# Patient Record
Sex: FEMALE | Race: WHITE | Employment: FULL TIME | URBAN - NONMETROPOLITAN AREA
[De-identification: names, ages, dates, MRNs, and addresses within clinical notes are randomized per-mention and may not be internally consistent; named-entity substitution may affect disease eponyms.]

---

## 2020-08-21 ENCOUNTER — HOSPITAL ENCOUNTER (EMERGENCY)
Age: 40
Discharge: HOME | End: 2020-08-21
Payer: COMMERCIAL

## 2020-08-21 ENCOUNTER — APPOINTMENT (OUTPATIENT)
Dept: ULTRASOUND IMAGING | Age: 40
End: 2020-08-21
Payer: COMMERCIAL

## 2020-08-21 ENCOUNTER — APPOINTMENT (OUTPATIENT)
Dept: CT IMAGING | Age: 40
End: 2020-08-21
Payer: COMMERCIAL

## 2020-08-21 VITALS
BODY MASS INDEX: 35.36 KG/M2 | TEMPERATURE: 98.7 F | RESPIRATION RATE: 16 BRPM | WEIGHT: 220 LBS | HEIGHT: 66 IN | DIASTOLIC BLOOD PRESSURE: 66 MMHG | HEART RATE: 68 BPM | SYSTOLIC BLOOD PRESSURE: 122 MMHG | OXYGEN SATURATION: 98 %

## 2020-08-21 LAB
ALBUMIN SERPL-MCNC: 4.2 G/DL (ref 3.5–5.1)
ALP BLD-CCNC: 55 U/L (ref 38–126)
ALT SERPL-CCNC: 38 U/L (ref 11–66)
AMPHETAMINE+METHAMPHETAMINE URINE SCREEN: NEGATIVE
ANION GAP SERPL CALCULATED.3IONS-SCNC: 9 MEQ/L (ref 8–16)
AST SERPL-CCNC: 34 U/L (ref 5–40)
BACTERIA: ABNORMAL /HPF
BARBITURATE QUANTITATIVE URINE: NEGATIVE
BASOPHILS # BLD: 0.8 %
BASOPHILS ABSOLUTE: 0.1 THOU/MM3 (ref 0–0.1)
BENZODIAZEPINE QUANTITATIVE URINE: NEGATIVE
BILIRUB SERPL-MCNC: < 0.2 MG/DL (ref 0.3–1.2)
BILIRUBIN DIRECT: < 0.2 MG/DL (ref 0–0.3)
BILIRUBIN URINE: NEGATIVE
BLOOD, URINE: ABNORMAL
BUN BLDV-MCNC: 14 MG/DL (ref 7–22)
CALCIUM SERPL-MCNC: 9.2 MG/DL (ref 8.5–10.5)
CANNABINOID QUANTITATIVE URINE: NEGATIVE
CASTS 2: ABNORMAL /LPF
CASTS UA: ABNORMAL /LPF
CHARACTER, URINE: CLEAR
CHLORIDE BLD-SCNC: 104 MEQ/L (ref 98–111)
CO2: 24 MEQ/L (ref 23–33)
COCAINE METABOLITE QUANTITATIVE URINE: NEGATIVE
COLOR: YELLOW
CREAT SERPL-MCNC: 0.9 MG/DL (ref 0.4–1.2)
CRYSTALS, UA: ABNORMAL
EOSINOPHIL # BLD: 1.8 %
EOSINOPHILS ABSOLUTE: 0.2 THOU/MM3 (ref 0–0.4)
EPITHELIAL CELLS, UA: ABNORMAL /HPF
ERYTHROCYTE [DISTWIDTH] IN BLOOD BY AUTOMATED COUNT: 14.7 % (ref 11.5–14.5)
ERYTHROCYTE [DISTWIDTH] IN BLOOD BY AUTOMATED COUNT: 44.6 FL (ref 35–45)
GFR SERPL CREATININE-BSD FRML MDRD: 69 ML/MIN/1.73M2
GLUCOSE BLD-MCNC: 136 MG/DL (ref 70–108)
GLUCOSE URINE: NEGATIVE MG/DL
HCT VFR BLD CALC: 36.2 % (ref 37–47)
HEMOGLOBIN: 11.8 GM/DL (ref 12–16)
IMMATURE GRANS (ABS): 0.08 THOU/MM3 (ref 0–0.07)
IMMATURE GRANULOCYTES: 0.7 %
KETONES, URINE: NEGATIVE
LEUKOCYTE ESTERASE, URINE: ABNORMAL
LIPASE: 23.5 U/L (ref 5.6–51.3)
LYMPHOCYTES # BLD: 18.5 %
LYMPHOCYTES ABSOLUTE: 2.1 THOU/MM3 (ref 1–4.8)
MCH RBC QN AUTO: 27.9 PG (ref 26–33)
MCHC RBC AUTO-ENTMCNC: 32.6 GM/DL (ref 32.2–35.5)
MCV RBC AUTO: 85.6 FL (ref 81–99)
MISCELLANEOUS 2: ABNORMAL
MONOCYTES # BLD: 6.8 %
MONOCYTES ABSOLUTE: 0.8 THOU/MM3 (ref 0.4–1.3)
NITRITE, URINE: NEGATIVE
NUCLEATED RED BLOOD CELLS: 0 /100 WBC
OPIATES, URINE: NEGATIVE
OSMOLALITY CALCULATION: 276.4 MOSMOL/KG (ref 275–300)
OXYCODONE: NEGATIVE
PH UA: 5.5 (ref 5–9)
PHENCYCLIDINE QUANTITATIVE URINE: NEGATIVE
PLATELET # BLD: 248 THOU/MM3 (ref 130–400)
PMV BLD AUTO: 11.3 FL (ref 9.4–12.4)
POTASSIUM SERPL-SCNC: 4.3 MEQ/L (ref 3.5–5.2)
PREGNANCY, SERUM: NEGATIVE
PROTEIN UA: NEGATIVE
RBC # BLD: 4.23 MILL/MM3 (ref 4.2–5.4)
RBC URINE: ABNORMAL /HPF
REASON FOR REJECTION: NORMAL
REJECTED TEST: NORMAL
RENAL EPITHELIAL, UA: ABNORMAL
SEG NEUTROPHILS: 71.4 %
SEGMENTED NEUTROPHILS ABSOLUTE COUNT: 7.9 THOU/MM3 (ref 1.8–7.7)
SODIUM BLD-SCNC: 137 MEQ/L (ref 135–145)
SPECIFIC GRAVITY, URINE: 1.01 (ref 1–1.03)
TOTAL PROTEIN: 7.3 G/DL (ref 6.1–8)
UROBILINOGEN, URINE: 0.2 EU/DL (ref 0–1)
WBC # BLD: 11.1 THOU/MM3 (ref 4.8–10.8)
WBC UA: ABNORMAL /HPF
YEAST: ABNORMAL

## 2020-08-21 PROCEDURE — 99284 EMERGENCY DEPT VISIT MOD MDM: CPT

## 2020-08-21 PROCEDURE — 74177 CT ABD & PELVIS W/CONTRAST: CPT

## 2020-08-21 PROCEDURE — 80053 COMPREHEN METABOLIC PANEL: CPT

## 2020-08-21 PROCEDURE — 96374 THER/PROPH/DIAG INJ IV PUSH: CPT

## 2020-08-21 PROCEDURE — 76830 TRANSVAGINAL US NON-OB: CPT

## 2020-08-21 PROCEDURE — 76856 US EXAM PELVIC COMPLETE: CPT

## 2020-08-21 PROCEDURE — 83690 ASSAY OF LIPASE: CPT

## 2020-08-21 PROCEDURE — 36415 COLL VENOUS BLD VENIPUNCTURE: CPT

## 2020-08-21 PROCEDURE — 2580000003 HC RX 258: Performed by: PHYSICIAN ASSISTANT

## 2020-08-21 PROCEDURE — 96375 TX/PRO/DX INJ NEW DRUG ADDON: CPT

## 2020-08-21 PROCEDURE — 6360000002 HC RX W HCPCS

## 2020-08-21 PROCEDURE — 84703 CHORIONIC GONADOTROPIN ASSAY: CPT

## 2020-08-21 PROCEDURE — 80307 DRUG TEST PRSMV CHEM ANLYZR: CPT

## 2020-08-21 PROCEDURE — 93975 VASCULAR STUDY: CPT

## 2020-08-21 PROCEDURE — 6360000004 HC RX CONTRAST MEDICATION: Performed by: PHYSICIAN ASSISTANT

## 2020-08-21 PROCEDURE — 82248 BILIRUBIN DIRECT: CPT

## 2020-08-21 PROCEDURE — 85025 COMPLETE CBC W/AUTO DIFF WBC: CPT

## 2020-08-21 PROCEDURE — 99283 EMERGENCY DEPT VISIT LOW MDM: CPT

## 2020-08-21 PROCEDURE — 81001 URINALYSIS AUTO W/SCOPE: CPT

## 2020-08-21 RX ORDER — ONDANSETRON 4 MG/1
4 TABLET, ORALLY DISINTEGRATING ORAL EVERY 8 HOURS PRN
Qty: 12 TABLET | Refills: 0 | Status: SHIPPED | OUTPATIENT
Start: 2020-08-21

## 2020-08-21 RX ORDER — ONDANSETRON 2 MG/ML
INJECTION INTRAMUSCULAR; INTRAVENOUS
Status: COMPLETED
Start: 2020-08-21 | End: 2020-08-21

## 2020-08-21 RX ORDER — 0.9 % SODIUM CHLORIDE 0.9 %
1000 INTRAVENOUS SOLUTION INTRAVENOUS ONCE
Status: COMPLETED | OUTPATIENT
Start: 2020-08-21 | End: 2020-08-21

## 2020-08-21 RX ORDER — CYCLOBENZAPRINE HCL 10 MG
10 TABLET ORAL 3 TIMES DAILY PRN
Qty: 21 TABLET | Refills: 0 | Status: SHIPPED | OUTPATIENT
Start: 2020-08-21 | End: 2020-08-21 | Stop reason: CLARIF

## 2020-08-21 RX ORDER — KETOROLAC TROMETHAMINE 10 MG/1
10 TABLET, FILM COATED ORAL EVERY 8 HOURS PRN
Qty: 15 TABLET | Refills: 0 | Status: SHIPPED | OUTPATIENT
Start: 2020-08-21

## 2020-08-21 RX ORDER — ONDANSETRON 2 MG/ML
4 INJECTION INTRAMUSCULAR; INTRAVENOUS ONCE
Status: COMPLETED | OUTPATIENT
Start: 2020-08-21 | End: 2020-08-21

## 2020-08-21 RX ORDER — KETOROLAC TROMETHAMINE 30 MG/ML
30 INJECTION, SOLUTION INTRAMUSCULAR; INTRAVENOUS ONCE
Status: COMPLETED | OUTPATIENT
Start: 2020-08-21 | End: 2020-08-21

## 2020-08-21 RX ORDER — KETOROLAC TROMETHAMINE 30 MG/ML
INJECTION, SOLUTION INTRAMUSCULAR; INTRAVENOUS
Status: COMPLETED
Start: 2020-08-21 | End: 2020-08-21

## 2020-08-21 RX ORDER — KETOROLAC TROMETHAMINE 10 MG/1
10 TABLET, FILM COATED ORAL EVERY 8 HOURS PRN
Qty: 15 TABLET | Refills: 0 | Status: SHIPPED | OUTPATIENT
Start: 2020-08-21 | End: 2020-08-21 | Stop reason: CLARIF

## 2020-08-21 RX ADMIN — ONDANSETRON 4 MG: 2 INJECTION INTRAMUSCULAR; INTRAVENOUS at 16:24

## 2020-08-21 RX ADMIN — KETOROLAC TROMETHAMINE 30 MG: 30 INJECTION, SOLUTION INTRAMUSCULAR; INTRAVENOUS at 16:26

## 2020-08-21 RX ADMIN — SODIUM CHLORIDE 1000 ML: 9 INJECTION, SOLUTION INTRAVENOUS at 16:24

## 2020-08-21 RX ADMIN — IOPAMIDOL 80 ML: 755 INJECTION, SOLUTION INTRAVENOUS at 17:50

## 2020-08-21 RX ADMIN — KETOROLAC TROMETHAMINE 30 MG: 30 INJECTION, SOLUTION INTRAMUSCULAR at 16:26

## 2020-08-21 ASSESSMENT — PAIN SCALES - GENERAL
PAINLEVEL_OUTOF10: 10
PAINLEVEL_OUTOF10: 2
PAINLEVEL_OUTOF10: 2

## 2020-08-21 NOTE — ED NOTES
ED nurse-to-nurse bedside report    Chief Complaint   Patient presents with    Flank Pain      LOC: alert and orientated to name, place, date  Vital signs   Vitals:    08/21/20 1607 08/21/20 1618 08/21/20 1835   BP: (!) 165/87  (!) 141/67   Pulse: 100  75   Resp: 18  16   Temp: 98.7 °F (37.1 °C)     TempSrc: Oral     SpO2: 99%  98%   Weight:  220 lb (99.8 kg)    Height:  5' 6\" (1.676 m)       Pain:    Pain Interventions: toradol  Pain Goal: 0  Oxygen: No    Current needs required none   Telemetry: No  LDAs:   Peripheral IV 08/21/20 Right Forearm (Active)   Site Assessment Clean;Dry; Intact 08/21/20 1618   Dressing Status Clean;Dry; Intact 08/21/20 1618     Continuous Infusions:   Mobility: Independent  [place] Fall Risk Score: No flowsheet data found.   Fall Interventions: none  Report given to: April Zamudio RN  08/21/20 2373

## 2020-08-21 NOTE — ED NOTES
Patient resting in bed. Respirations easy and unlabored. No distress noted. Call light within reach. Updated on POC. Denies pain at this time.       Dulce Walter RN  08/21/20 9139

## 2020-08-21 NOTE — ED NOTES
Received handoff from [place], [facility]. Pt resting in bed, VSS. Respirs easy and unlabored. Pt denies needs or pain currently. Call light in place.       Deborah Nelson  08/21/20 1911

## 2020-08-22 ASSESSMENT — ENCOUNTER SYMPTOMS
BACK PAIN: 0
CONSTIPATION: 0
ABDOMINAL PAIN: 1
SHORTNESS OF BREATH: 0
COUGH: 0
BLOOD IN STOOL: 0
RHINORRHEA: 0
VOMITING: 1
NAUSEA: 1
COLOR CHANGE: 0
DIARRHEA: 0
SORE THROAT: 0

## 2020-08-22 NOTE — ED PROVIDER NOTES
[facility] EMERGENCY DEPT      CHIEF COMPLAINT       Chief Complaint   Patient presents with    Flank Pain       Nurses Notes reviewed and I agree except asnoted in the HPI. HISTORY OFPRESENT ILLNESS    Deborrmaximino Norton is a 38 y.o. female who presents to the emergency department for evaluation of right flank and right lower quadrant abdominal pain. The patient reports that at approximately 1000 this morning, she developed pain in her right lower flank that radiates into her right lower abdomen. The patient reports worsening of her pain at approximately 1400 the afternoon. The patient reports associated nausea and states that she vomited 1 time prior to arrival to this department. The patient reports having chills and a subjective fever but denies any documented fever. The patient denies any diarrhea, constipation, urinary symptoms, or blood in her urine or stool. The patient reports that she is currently menstruating but states that her menstrual period has been normal.  The patient otherwise denies any vaginal discharge. The patient denies chest pain, shortness of breath, or URI symptoms. The patient denies a history of kidney stones. The patient denies a history of abdominal problems or surgeries. There are no additional complaints at this time. REVIEW OF SYSTEMS      Review of Systems   Constitutional: Positive for chills and fever (subjective). Negative for fatigue. HENT: Negative for congestion, ear pain, rhinorrhea and sore throat. Respiratory: Negative for cough and shortness of breath. Cardiovascular: Negative for chest pain and palpitations. Gastrointestinal: Positive for abdominal pain (RLQ), nausea and vomiting. Negative for blood in stool, constipation and diarrhea. Genitourinary: Positive for flank pain (right) and vaginal bleeding (Currently menstruating).  Negative for decreased urine volume, difficulty urinating, dysuria, frequency, hematuria, pelvic pain, urgency, vaginal discharge and vaginal pain. Musculoskeletal: Negative for arthralgias, back pain, myalgias and neck pain. Skin: Negative for color change and pallor. Neurological: Negative for dizziness, weakness and light-headedness. Hematological: Negative for adenopathy. PAST MEDICAL HISTORY    has no past medical history on file. SURGICAL HISTORY      has no past surgical history on file. CURRENT MEDICATIONS       Discharge Medication List as of 8/21/2020  9:02 PM          ALLERGIES     has No Known Allergies. FAMILY HISTORY     has no family status information on file. @[employer]@    SOCIAL HISTORY      reports that she has never smoked. She has never used smokeless tobacco. She reports previous alcohol use. She reports that she does not use drugs. PHYSICAL EXAM     INITIAL VITALS:  height is 5' 6\" (1.676 m) and weight is 220 lb (99.8 kg). Her oral temperature is 98.7 °F (37.1 °C). Her blood pressure is 122/66 and her pulse is 68. Her respiration is 16 and oxygen saturation is 98%. Physical Exam  Vitals signs and nursing note reviewed. Constitutional:       General: She is not in acute distress. Appearance: Normal appearance. She is well-developed. She is not ill-appearing, toxic-appearing or diaphoretic. HENT:      Head: Normocephalic and atraumatic. Right Ear: External ear normal.      Left Ear: External ear normal.      Nose: Nose normal.      Mouth/Throat:      Mouth: Mucous membranes are moist.      Pharynx: Oropharynx is clear. Eyes:      General: No scleral icterus. Right eye: No discharge. Left eye: No discharge. Conjunctiva/sclera: Conjunctivae normal.      Pupils: Pupils are equal, round, and reactive to light. Neck:      Musculoskeletal: Normal range of motion. Cardiovascular:      Rate and Rhythm: Normal rate and regular rhythm. Heart sounds: Normal heart sounds. No murmur. No friction rub. No gallop.     Pulmonary:      Effort: represent a large amount of blood in the endometrial canal which may be normal for this patient, other    possibilities such as endometritis or excessive bleeding from an endometrial vascular reformation cannot be excluded. Clinical correlation recommended. BI-RADS considered. 2. Small solitary benign-appearing bilateral ovarian cysts. No evidence for recurrent torsion. **This report has been created using voice recognition software. It may contain minor errors which are inherent in voice recognition technology. **      Final report electronically signed by Dr. Elmira Ray on 8/21/2020 7:05 PM      [email or web address]   Final Result   1. Markedly enlarged uterus. Markedly thickened endometrial stripe. This may be related to the patient's normal menstrual cycle and may simply represent a large amount of blood in the endometrial canal which may be normal for this patient, other    possibilities such as endometritis or excessive bleeding from an endometrial vascular reformation cannot be excluded. Clinical correlation recommended. BI-RADS considered. 2. Small solitary benign-appearing bilateral ovarian cysts. No evidence for recurrent torsion. **This report has been created using voice recognition software. It may contain minor errors which are inherent in voice recognition technology. **      Final report electronically signed by Dr. Elmira Ray on 8/21/2020 7:05 PM      [facility] ABD PEL RETRO [address] [place] [employer]   Final Result   1. Markedly enlarged uterus. Markedly thickened endometrial stripe. This may be related to the patient's normal menstrual cycle and may simply represent a large amount of blood in the endometrial canal which may be normal for this patient, other    possibilities such as endometritis or excessive bleeding from an endometrial vascular reformation cannot be excluded. Clinical correlation recommended. BI-RADS considered.    2. Small solitary benign-appearing bilateral ovarian cysts. No evidence for recurrent torsion. **This report has been created using voice recognition software. It may contain minor errors which are inherent in voice recognition technology. **      Final report electronically signed by Dr. Anastacia Carreon on 8/21/2020 7:05 PM      CT ABDOMEN PELVIS W IV CONTRAST Additional Contrast? None   Final Result   There is right hydroureteronephrosis and mild perinephric stranding. No obstructing ureteral stones. Hydronephrosis is likely related to mass effect from uterus. Uterus is 13 x 13 cm. There is a enlarged, heterogeneous uterus with large amount of fluid    in the endometrium. Etiology is not clear. Pelvic ultrasound is advised for further assessment. Endometrium is 10 cm. Findings discussed with Roshni Yee PAC at 6:16 PM on 8/21/2020   **This report has been created using voice recognition software. It may contain minor errors which are inherent in voice recognition technology. **      Final report electronically signed by Dr. Burak Ozuna on 8/21/2020 6:16 PM          LABS:   Labs Reviewed   CBC WITH AUTO DIFFERENTIAL - Abnormal; Notable for the following components:       Result Value    WBC 11.1 (*)     Hemoglobin 11.8 (*)     Hematocrit 36.2 (*)     RDW-CV 14.7 (*)     Segs Absolute 7.9 (*)     Immature Grans (Abs) 0.08 (*)     All other components within normal limits   BASIC METABOLIC PANEL - Abnormal; Notable for the following components:    Glucose 136 (*)     All other components within normal limits   HEPATIC FUNCTION PANEL - Abnormal; Notable for the following components:     Total Bilirubin <0.2 (*)     All other components within normal limits   GLOMERULAR FILTRATION RATE, ESTIMATED - Abnormal; Notable for the following components:    Est, Glom Filt Rate 69 (*)     All other components within normal limits   URINE WITH REFLEXED MICRO - Abnormal; Notable for the following components:    Blood, Urine LARGE (*)     Leukocyte Esterase, Urine TRACE (*)     All other components within normal limits   HCG, SERUM, QUALITATIVE   URINE DRUG SCREEN   SPECIMEN REJECTION   LIPASE   ANION GAP   OSMOLALITY       EMERGENCY DEPARTMENT COURSE:   Vitals:    Vitals:    08/21/20 1607 08/21/20 1618 08/21/20 1835 08/21/20 1950   BP: (!) 165/87  (!) 141/67 122/66   Pulse: 100  75 68   Resp: 18  16 16   Temp: 98.7 °F (37.1 °C)      TempSrc: Oral      SpO2: 99%  98% 98%   Weight:  220 lb (99.8 kg)     Height:  5' 6\" (1.676 m)       The patient was seen and evaluated within the ED today with right flank and right lower quadrant abdominal pain. Within the department, I observed the patient's vital signs to be within acceptable range, and she was afebrile. On exam, I appreciated clear lung sounds. There is no chest wall tenderness. There is mild right lower quadrant abdominal tenderness without guarding, rigidity, or rebound tenderness. There is no CVA tenderness to percussion bilaterally. Radiologic and US studies within the department were ordered and are pending at the time of shift change. Laboratory work revealed a white blood cell count of 11.1. Renal, hepatic, biliary, and pancreatic function tests are within normal range. Electrolytes are within normal range. UA revealed hematuria but is not suspicious for UTI.  UDS is negative. Within the department, the patient was treated with IV saline, Toradol, and Zofran. I observed the patient's condition to remain stable during the duration of the stay. At shift change, Chiquis Tiwari CNP, took over the patient's care. He will review results, order additional tests and labs, treat, consult, and admit or discharge as appropriate. CRITICAL CARE:   None    CONSULTS:  None    PROCEDURES:  None    FINAL IMPRESSION      1. RLQ abdominal pain    2. Right flank pain    3. Hydronephrosis, unspecified hydronephrosis type    4. Enlarged uterus          DISPOSITION/PLAN     1. RLQ abdominal pain    2. Right flank pain    3.

## 2020-08-22 NOTE — ED PROVIDER NOTES
Patient was seen and evaluated in the emergency department, patient appeared to be in no acute distress. Patient was seen and evaluated previously by Aston Rios PA-C, from whom I took over this case and patient care. I have personally performed and/or participated in the history, exam and medical decision making and agree with all pertinent clinical information as noted by the previous provider. I have also reviewed and agree with the past medical, family and social history unless otherwise noted. I have personally performed a face-to-face diagnostic evaluation on this patient. I have reviewed the previous provider's orders for imaging and labs as well as the previous provider's documentation. 1800 assumed patient care at this time due to shift change. The patient has right flank pain. She was treated with IV fluids and Toradol is currently pain-free. Patient has pending labs and CT and pelvic ultrasound. Patient has noted large amount of blood in the urine but the patient is currently on her menstrual period and likely contaminated. Other labs are reassuring. CT of the abdomen pelvis: There is right hydroureteronephrosis and mild perinephric stranding. No obstructing ureteral stones. Hydronephrosis is likely related to mass effect from uterus. Uterus is 13 x 13 cm. There is a enlarged, heterogeneous uterus with large amount of fluid    in the endometrium. Etiology is not clear. Pelvic ultrasound is advised for further assessment. Endometrium is 10 cm. Pelvic US:  1. Markedly enlarged uterus. Markedly thickened endometrial stripe. This may be related to the patient's normal menstrual cycle and may simply represent a large amount of blood in the endometrial canal which may be normal for this patient, other    possibilities such as endometritis or excessive bleeding from an endometrial vascular reformation cannot be excluded. Clinical correlation recommended. BI-RADS considered.     2. Small solitary benign-appearing bilateral ovarian cysts. No evidence for recurrent torsion.                I reevaluated the patient after CT and ultrasound results back. Patient states that she is pain-free. No nausea or vomiting. She states she feels well. She has used the restroom with no difficulty    I contacted Sidney Kohler CNP on-call for urology. I advised her of the CT findings and also her ultrasound findings. She advised if the patient is afebrile, her pain is under control. She is not vomiting and does not have significant elevated white blood cell count, the patient could be discharged and follow-up in the clinic on Monday for reevaluation. I discussed discharge with the patient. She is amenable to discharge. She states she feels comfortable being discharged as she currently is pain-free and voiding without difficulty. The patient currently does not have an OB/GYN. I offered to give contact information for on-call OB/GYN and the patient states that she would prefer to follow-up with Dr. Cailin Lujan. The patient will be discharged home with Toradol to take as needed as directed for recurrent pain, Zofran for any recurrent nausea or vomiting. Return to the emergency department for uncontrolled pain, temperature 100.5 or greater, vomiting, new concerns.     Final Impression:  Right flank pain  Hydronephrosis, unspecified type  Enlarged Uterus    Plan:  Discharge     ALTON Oropeza CNP  08/22/20 0000

## 2020-08-24 ENCOUNTER — OFFICE VISIT (OUTPATIENT)
Dept: UROLOGY | Age: 40
End: 2020-08-24
Payer: COMMERCIAL

## 2020-08-24 ENCOUNTER — TELEPHONE (OUTPATIENT)
Dept: UROLOGY | Age: 40
End: 2020-08-24

## 2020-08-24 VITALS — BODY MASS INDEX: 36.22 KG/M2 | HEIGHT: 66 IN | WEIGHT: 225.4 LBS | TEMPERATURE: 97.2 F

## 2020-08-24 LAB
BILIRUBIN URINE: NEGATIVE
BLOOD URINE, POC: ABNORMAL
CHARACTER, URINE: CLEAR
COLOR, URINE: YELLOW
GLUCOSE URINE: NEGATIVE MG/DL
KETONES, URINE: NEGATIVE
LEUKOCYTE CLUMPS, URINE: NEGATIVE
NITRITE, URINE: NEGATIVE
PH, URINE: 6 (ref 5–9)
PROTEIN, URINE: ABNORMAL MG/DL
SPECIFIC GRAVITY, URINE: 1.02 (ref 1–1.03)
UROBILINOGEN, URINE: 0.2 EU/DL (ref 0–1)

## 2020-08-24 PROCEDURE — 99204 OFFICE O/P NEW MOD 45 MIN: CPT | Performed by: NURSE PRACTITIONER

## 2020-08-24 PROCEDURE — 81003 URINALYSIS AUTO W/O SCOPE: CPT | Performed by: NURSE PRACTITIONER

## 2020-08-24 NOTE — PROGRESS NOTES
[address] [place], [place] [id number]  Dept: [phone]  Loc: [phone]  Visit Date: 8/24/2020    HPI:     Roxana Brown is a new patient who was referred here by [facility] for hydronephrosis, right flank pain. Pt reports having right flank pain with nausea and vomiting, urinary frequency and pressure starting on Friday. She proceeded to the ER due to the pain. CT of abdomen and Pelvis was complete which showed right hydroureteronephrosis with mild perinephric stranding. No obstructing stones were seen. Hydro was likely caused by mass effect from uterus, measuring 13 x 13 cm. CRT normal, WBC 11.1, Urine showed large blood, and trace leukocytes. She reports she was given Toradol in the ER and has not had right sided flank pain since. She is on her menses. She is passing clots, and it is lasting longer than her normal course. She denies any history of kidney stones. No family history of kidney stones. She denies any history of gross hematuria, recurrent UTI. Denies any dysuria. She denies any dizziness. Roxana Brown is here today by self. History gathered from patient and medical records. History reviewed. No pertinent past medical history. History reviewed. No pertinent surgical history. History reviewed. No pertinent family history. Social History     Tobacco Use    Smoking status: Never Smoker    Smokeless tobacco: Never Used   Substance Use Topics    Alcohol use: Not Currently      Current Outpatient Medications   Medication Sig Dispense Refill    ketorolac (TORADOL) 10 MG tablet Take 1 tablet by mouth every 8 hours as needed for Pain 15 tablet 0    ondansetron (ZOFRAN ODT) 4 MG disintegrating tablet Take 1 tablet by mouth every 8 hours as needed for Nausea or Vomiting (Patient not taking: Reported on 8/24/2020) 12 tablet 0     No current facility-administered medications for this visit.       No Known Allergies    ROS:  Constitutional: Negative for chills, fatigue, fever, or weight loss. Eyes: Denies reported visual changes. ENT: Denies headache, difficulty swallowing, nose bleeds, ringing in ears, or earaches. Cardiovascular: Negative for chest pain, palpitations, tachycardia or edema. Respiratory: Denies cough or SOB. GI:The patient denies abdominal or flank pain, anorexia, nausea or vomiting. : See HPI  Musculoskeletal: Patient denies low back pain or painful or reduced ROM of the back or extremities. Neurological: The patient denies any symptoms of neurological impairment or TIA's; no history of stroke. Lymphatic: Denies swollen glands in neck, axillary or inguinal areas. Psychiatric: Denies anxiety or depression. Skin: Denies rash or lesions. The remainder of the complete ROS is negative    PHYSICAL EXAM:  VITALS:  Temp 97.2 °F (36.2 °C)   Ht 5' 6\" (1.676 m)   Wt 225 lb 6.4 oz (102.2 kg)   LMP 08/21/2020   BMI 36.38 kg/m² . Constitutional:    Alert and oriented times 3, no acute distress and cooperative to examination with appropriate mood and affect. HEENT:   Head:         Normocephalic and atraumatic. Mouth/Throat:          Mucous membranes are normal.   Eyes:         EOM are normal. No scleral icterus. Nose:    The external appearance of the nose is normal  Ears: The ears appear normal to external inspection   Neck:         Supple, symmetrical, trachea midline, no adenopathy, thyroid symmetric, not enlarged and no tenderness. Cardiovascular:        Normal rate, regular rhythm, S1 S2 heart sounds. Pulmonary/Chest:       Chest symmetric with normal A/P diameter, no wheezes, rales, or rhonchi noted. Normal respiratory rate and rhthym. No use of accessory muscles. Abdominal:          Soft. No tenderness. Active bowel sounds. NO CVA tenderness  Musculoskeletal:    Normal range of motion. Extremities:    No cyanosis, clubbing, or edema present.   Neurological:    Alert and oriented. DATA:  CBC:   Lab Results   Component Value Date    WBC 11.1 08/21/2020    RBC 4.23 08/21/2020    HGB 11.8 08/21/2020    HCT 36.2 08/21/2020    MCV 85.6 08/21/2020    MCH 27.9 08/21/2020    MCHC 32.6 08/21/2020     08/21/2020    MPV 11.3 08/21/2020     BMP:    Lab Results   Component Value Date     08/21/2020    K 4.3 08/21/2020     08/21/2020    CO2 24 08/21/2020    BUN 14 08/21/2020    CREATININE 0.9 08/21/2020    CALCIUM 9.2 08/21/2020    LABGLOM 69 08/21/2020    GLUCOSE 136 08/21/2020     BUN/Creatinine:    Lab Results   Component Value Date    BUN 14 08/21/2020    CREATININE 0.9 08/21/2020     Magnesium:  No results found for: MG  Phosphorus:  No results found for: PHOS  PT/INR:  No results found for: PROTIME, INR  U/A:    Lab Results   Component Value Date    COLORU Yellow 08/24/2020    COLORU YELLOW 08/21/2020    PHUR 5.5 08/21/2020    WBCUA 2-4 08/21/2020    RBCUA  08/21/2020    YEAST NONE SEEN 08/21/2020    BACTERIA NONE SEEN 08/21/2020    LEUKOCYTESUR TRACE 08/21/2020    UROBILINOGEN 0.20 08/24/2020    BILIRUBINUR Negative 08/24/2020    BLOODU Large 08/24/2020    BLOODU LARGE 08/21/2020    GLUCOSEU Negative 08/24/2020       Imaging: The patient has had a CT With Contrast which I have reviewed along with its accompanying report. The study demonstrates : There is right hydroureteronephrosis and mild perinephric stranding. No obstructing ureteral stones. Hydronephrosis is likely related to mass effect from uterus. Uterus is 13 x 13 cm. There is a enlarged, heterogeneous uterus with large amount of fluid    in the endometrium. Etiology is not clear. Pelvic ultrasound is advised for further assessment. Endometrium is 10 cm.             PELVIC US     1. Markedly enlarged uterus. Markedly thickened endometrial stripe.  This may be related to the patient's normal menstrual cycle and may simply represent a large amount of blood in the endometrial canal which may be normal for this patient, other    possibilities such as endometritis or excessive bleeding from an endometrial vascular reformation cannot be excluded. Clinical correlation recommended. BI-RADS considered. 2. Small solitary benign-appearing bilateral ovarian cysts. No evidence for recurrent torsion.             Assessment & Plan:         Diagnosis Orders   1. Hydroureteronephrosis  POCT Urinalysis No Micro (Auto)    US RENAL COMPLETE   2. Elevated PSA         All referral documents were reviewed. Discussed with Dr Ana Vazquez. Recommend fu with GYN. She is scheduled for appt in September. We discussed return precautions. She is not having any further flank pain or irritative voiding symptoms. Will FU in 2 months with Renal US prior, and obtain UA. She will call sooner with any problems.       Electronically signed by ALTON Cooper CNP on 8/24/2020 at 1:29 PM

## 2020-08-24 NOTE — TELEPHONE ENCOUNTER
Patient is scheduled for a Renal US Complete in  [address] [place] [address] on 10/19/20 arrival of 9:40 am for a 10:00 am scan time. No carbonated beverages the day of and arrive with a full bladder of water. Follow up made for 10/26/20 @ 10:00 am. Patient left with the instructions.

## 2020-10-19 ENCOUNTER — HOSPITAL ENCOUNTER (OUTPATIENT)
Dept: ULTRASOUND IMAGING | Age: 40
Discharge: HOME | End: 2020-10-19
Payer: COMMERCIAL

## 2020-10-19 PROCEDURE — 76770 US EXAM ABDO BACK WALL COMP: CPT

## 2020-10-20 ENCOUNTER — TELEPHONE (OUTPATIENT)
Dept: UROLOGY | Age: 40
End: 2020-10-20

## 2020-10-20 NOTE — TELEPHONE ENCOUNTER
Patient stated she has seen several times already since her follow up with you. She has an appointment scheduled. Thank you.

## 2020-12-01 ENCOUNTER — HOSPITAL ENCOUNTER (OUTPATIENT)
Age: 40
Discharge: HOME | End: 2020-12-01
Payer: COMMERCIAL

## 2020-12-01 LAB
BASOPHILS # BLD: 0.8 %
BASOPHILS ABSOLUTE: 0 THOU/MM3 (ref 0–0.1)
EOSINOPHIL # BLD: 1.6 %
EOSINOPHILS ABSOLUTE: 0.1 THOU/MM3 (ref 0–0.4)
ERYTHROCYTE [DISTWIDTH] IN BLOOD BY AUTOMATED COUNT: 15.5 % (ref 11.5–14.5)
ERYTHROCYTE [DISTWIDTH] IN BLOOD BY AUTOMATED COUNT: 44.2 FL (ref 35–45)
HCT VFR BLD CALC: 37.8 % (ref 37–47)
HEMOGLOBIN: 11.5 GM/DL (ref 12–16)
IMMATURE GRANS (ABS): 0.02 THOU/MM3 (ref 0–0.07)
IMMATURE GRANULOCYTES: 0.4 %
LYMPHOCYTES # BLD: 36 %
LYMPHOCYTES ABSOLUTE: 1.8 THOU/MM3 (ref 1–4.8)
MCH RBC QN AUTO: 24.2 PG (ref 26–33)
MCHC RBC AUTO-ENTMCNC: 30.4 GM/DL (ref 32.2–35.5)
MCV RBC AUTO: 79.4 FL (ref 81–99)
MONOCYTES # BLD: 10.5 %
MONOCYTES ABSOLUTE: 0.5 THOU/MM3 (ref 0.4–1.3)
NUCLEATED RED BLOOD CELLS: 0 /100 WBC
PLATELET # BLD: 267 THOU/MM3 (ref 130–400)
PMV BLD AUTO: 11.2 FL (ref 9.4–12.4)
RBC # BLD: 4.76 MILL/MM3 (ref 4.2–5.4)
SEG NEUTROPHILS: 50.7 %
SEGMENTED NEUTROPHILS ABSOLUTE COUNT: 2.5 THOU/MM3 (ref 1.8–7.7)
WBC # BLD: 4.9 THOU/MM3 (ref 4.8–10.8)

## 2020-12-01 PROCEDURE — 85025 COMPLETE CBC W/AUTO DIFF WBC: CPT

## 2020-12-01 PROCEDURE — 36415 COLL VENOUS BLD VENIPUNCTURE: CPT

## 2020-12-01 PROCEDURE — U0003 INFECTIOUS AGENT DETECTION BY NUCLEIC ACID (DNA OR RNA); SEVERE ACUTE RESPIRATORY SYNDROME CORONAVIRUS 2 (SARS-COV-2) (CORONAVIRUS DISEASE [COVID-19]), AMPLIFIED PROBE TECHNIQUE, MAKING USE OF HIGH THROUGHPUT TECHNOLOGIES AS DESCRIBED BY CMS-2020-01-R: HCPCS

## 2020-12-03 LAB — SARS-COV-2: DETECTED

## 2021-05-18 ENCOUNTER — HOSPITAL ENCOUNTER (OUTPATIENT)
Age: 41
Discharge: HOME | End: 2021-05-18
Payer: COMMERCIAL

## 2021-05-18 LAB
BASOPHILS # BLD: 1 %
BASOPHILS ABSOLUTE: 0.1 THOU/MM3 (ref 0–0.1)
EOSINOPHIL # BLD: 5.9 %
EOSINOPHILS ABSOLUTE: 0.5 THOU/MM3 (ref 0–0.4)
ERYTHROCYTE [DISTWIDTH] IN BLOOD BY AUTOMATED COUNT: 17.1 % (ref 11.5–14.5)
ERYTHROCYTE [DISTWIDTH] IN BLOOD BY AUTOMATED COUNT: 49.1 FL (ref 35–45)
HCT VFR BLD CALC: 33.4 % (ref 37–47)
HEMOGLOBIN: 10 GM/DL (ref 12–16)
IMMATURE GRANS (ABS): 0.03 THOU/MM3 (ref 0–0.07)
IMMATURE GRANULOCYTES: 0.4 %
INFLUENZA A: NOT DETECTED
INFLUENZA B: NOT DETECTED
LYMPHOCYTES # BLD: 27.3 %
LYMPHOCYTES ABSOLUTE: 2.2 THOU/MM3 (ref 1–4.8)
MCH RBC QN AUTO: 23.7 PG (ref 26–33)
MCHC RBC AUTO-ENTMCNC: 29.9 GM/DL (ref 32.2–35.5)
MCV RBC AUTO: 79.1 FL (ref 81–99)
MONOCYTES # BLD: 8 %
MONOCYTES ABSOLUTE: 0.6 THOU/MM3 (ref 0.4–1.3)
NUCLEATED RED BLOOD CELLS: 0 /100 WBC
PLATELET # BLD: 317 THOU/MM3 (ref 130–400)
PMV BLD AUTO: 10.4 FL (ref 9.4–12.4)
RBC # BLD: 4.22 MILL/MM3 (ref 4.2–5.4)
SARS-COV-2 RNA, RT PCR: NOT DETECTED
SEG NEUTROPHILS: 57.4 %
SEGMENTED NEUTROPHILS ABSOLUTE COUNT: 4.5 THOU/MM3 (ref 1.8–7.7)
WBC # BLD: 7.9 THOU/MM3 (ref 4.8–10.8)

## 2021-05-18 PROCEDURE — 87636 SARSCOV2 & INF A&B AMP PRB: CPT

## 2021-05-18 PROCEDURE — 36415 COLL VENOUS BLD VENIPUNCTURE: CPT

## 2021-05-18 PROCEDURE — 85025 COMPLETE CBC W/AUTO DIFF WBC: CPT

## 2021-05-25 ENCOUNTER — ANESTHESIA EVENT (OUTPATIENT)
Dept: OPERATING ROOM | Age: 41
End: 2021-05-25
Payer: COMMERCIAL

## 2021-05-25 ENCOUNTER — ANESTHESIA (OUTPATIENT)
Dept: OPERATING ROOM | Age: 41
End: 2021-05-25
Payer: COMMERCIAL

## 2021-05-25 ENCOUNTER — HOSPITAL ENCOUNTER (OUTPATIENT)
Age: 41
Setting detail: OUTPATIENT SURGERY
Discharge: HOME | End: 2021-05-25
Attending: OBSTETRICS & GYNECOLOGY | Admitting: OBSTETRICS & GYNECOLOGY
Payer: COMMERCIAL

## 2021-05-25 VITALS
HEIGHT: 66 IN | TEMPERATURE: 96.9 F | OXYGEN SATURATION: 96 % | DIASTOLIC BLOOD PRESSURE: 57 MMHG | WEIGHT: 218 LBS | HEART RATE: 86 BPM | BODY MASS INDEX: 35.03 KG/M2 | RESPIRATION RATE: 14 BRPM | SYSTOLIC BLOOD PRESSURE: 118 MMHG

## 2021-05-25 VITALS
RESPIRATION RATE: 14 BRPM | DIASTOLIC BLOOD PRESSURE: 57 MMHG | SYSTOLIC BLOOD PRESSURE: 115 MMHG | OXYGEN SATURATION: 100 %

## 2021-05-25 DIAGNOSIS — N92.0 MENORRHAGIA WITH REGULAR CYCLE: Primary | ICD-10-CM

## 2021-05-25 LAB — PREGNANCY, URINE: NEGATIVE

## 2021-05-25 PROCEDURE — 81025 URINE PREGNANCY TEST: CPT

## 2021-05-25 PROCEDURE — 6370000000 HC RX 637 (ALT 250 FOR IP): Performed by: OBSTETRICS & GYNECOLOGY

## 2021-05-25 PROCEDURE — 3700000000 HC ANESTHESIA ATTENDED CARE: Performed by: OBSTETRICS & GYNECOLOGY

## 2021-05-25 PROCEDURE — 6360000002 HC RX W HCPCS: Performed by: REGISTERED NURSE

## 2021-05-25 PROCEDURE — 7100000010 HC PHASE II RECOVERY - FIRST 15 MIN: Performed by: OBSTETRICS & GYNECOLOGY

## 2021-05-25 PROCEDURE — 7100000000 HC PACU RECOVERY - FIRST 15 MIN: Performed by: OBSTETRICS & GYNECOLOGY

## 2021-05-25 PROCEDURE — 88305 TISSUE EXAM BY PATHOLOGIST: CPT

## 2021-05-25 PROCEDURE — 2709999900 HC NON-CHARGEABLE SUPPLY: Performed by: OBSTETRICS & GYNECOLOGY

## 2021-05-25 PROCEDURE — 7100000011 HC PHASE II RECOVERY - ADDTL 15 MIN: Performed by: OBSTETRICS & GYNECOLOGY

## 2021-05-25 PROCEDURE — 3600000002 HC SURGERY LEVEL 2 BASE: Performed by: OBSTETRICS & GYNECOLOGY

## 2021-05-25 PROCEDURE — C1758 CATHETER, URETERAL: HCPCS | Performed by: OBSTETRICS & GYNECOLOGY

## 2021-05-25 PROCEDURE — 3700000001 HC ADD 15 MINUTES (ANESTHESIA): Performed by: OBSTETRICS & GYNECOLOGY

## 2021-05-25 PROCEDURE — 3600000012 HC SURGERY LEVEL 2 ADDTL 15MIN: Performed by: OBSTETRICS & GYNECOLOGY

## 2021-05-25 PROCEDURE — 7100000001 HC PACU RECOVERY - ADDTL 15 MIN: Performed by: OBSTETRICS & GYNECOLOGY

## 2021-05-25 PROCEDURE — 2580000003 HC RX 258: Performed by: OBSTETRICS & GYNECOLOGY

## 2021-05-25 RX ORDER — ONDANSETRON 2 MG/ML
INJECTION INTRAMUSCULAR; INTRAVENOUS PRN
Status: DISCONTINUED | OUTPATIENT
Start: 2021-05-25 | End: 2021-05-25 | Stop reason: SDUPTHER

## 2021-05-25 RX ORDER — MIDAZOLAM HYDROCHLORIDE 1 MG/ML
INJECTION INTRAMUSCULAR; INTRAVENOUS PRN
Status: DISCONTINUED | OUTPATIENT
Start: 2021-05-25 | End: 2021-05-25 | Stop reason: SDUPTHER

## 2021-05-25 RX ORDER — ONDANSETRON 2 MG/ML
8 INJECTION INTRAMUSCULAR; INTRAVENOUS EVERY 8 HOURS PRN
Status: DISCONTINUED | OUTPATIENT
Start: 2021-05-25 | End: 2021-05-25 | Stop reason: HOSPADM

## 2021-05-25 RX ORDER — MEGESTROL ACETATE 40 MG/1
40 TABLET ORAL 2 TIMES DAILY
Qty: 30 TABLET | Refills: 3 | Status: SHIPPED | OUTPATIENT
Start: 2021-05-25

## 2021-05-25 RX ORDER — PROPOFOL 10 MG/ML
INJECTION, EMULSION INTRAVENOUS PRN
Status: DISCONTINUED | OUTPATIENT
Start: 2021-05-25 | End: 2021-05-25 | Stop reason: SDUPTHER

## 2021-05-25 RX ORDER — ACETAMINOPHEN 325 MG/1
650 TABLET ORAL EVERY 4 HOURS PRN
Status: DISCONTINUED | OUTPATIENT
Start: 2021-05-25 | End: 2021-05-25 | Stop reason: HOSPADM

## 2021-05-25 RX ORDER — HYDROCODONE BITARTRATE AND ACETAMINOPHEN 5; 325 MG/1; MG/1
2 TABLET ORAL EVERY 4 HOURS PRN
Status: DISCONTINUED | OUTPATIENT
Start: 2021-05-25 | End: 2021-05-25 | Stop reason: HOSPADM

## 2021-05-25 RX ORDER — SODIUM CHLORIDE, SODIUM LACTATE, POTASSIUM CHLORIDE, CALCIUM CHLORIDE 600; 310; 30; 20 MG/100ML; MG/100ML; MG/100ML; MG/100ML
INJECTION, SOLUTION INTRAVENOUS CONTINUOUS
Status: DISCONTINUED | OUTPATIENT
Start: 2021-05-25 | End: 2021-05-25 | Stop reason: HOSPADM

## 2021-05-25 RX ORDER — HYDROCODONE BITARTRATE AND ACETAMINOPHEN 5; 325 MG/1; MG/1
1 TABLET ORAL EVERY 6 HOURS PRN
Qty: 10 TABLET | Refills: 0 | Status: SHIPPED | OUTPATIENT
Start: 2021-05-25 | End: 2021-05-28

## 2021-05-25 RX ORDER — FENTANYL CITRATE 50 UG/ML
INJECTION, SOLUTION INTRAMUSCULAR; INTRAVENOUS PRN
Status: DISCONTINUED | OUTPATIENT
Start: 2021-05-25 | End: 2021-05-25 | Stop reason: SDUPTHER

## 2021-05-25 RX ORDER — SODIUM CHLORIDE, SODIUM LACTATE, POTASSIUM CHLORIDE, CALCIUM CHLORIDE 600; 310; 30; 20 MG/100ML; MG/100ML; MG/100ML; MG/100ML
INJECTION, SOLUTION INTRAVENOUS SEE ADMIN INSTRUCTIONS
Status: DISCONTINUED | OUTPATIENT
Start: 2021-05-25 | End: 2021-05-25 | Stop reason: HOSPADM

## 2021-05-25 RX ORDER — HYDROCODONE BITARTRATE AND ACETAMINOPHEN 5; 325 MG/1; MG/1
1 TABLET ORAL EVERY 4 HOURS PRN
Status: DISCONTINUED | OUTPATIENT
Start: 2021-05-25 | End: 2021-05-25 | Stop reason: HOSPADM

## 2021-05-25 RX ORDER — DEXAMETHASONE SODIUM PHOSPHATE 10 MG/ML
INJECTION, EMULSION INTRAMUSCULAR; INTRAVENOUS PRN
Status: DISCONTINUED | OUTPATIENT
Start: 2021-05-25 | End: 2021-05-25 | Stop reason: SDUPTHER

## 2021-05-25 RX ORDER — KETOROLAC TROMETHAMINE 30 MG/ML
INJECTION, SOLUTION INTRAMUSCULAR; INTRAVENOUS PRN
Status: DISCONTINUED | OUTPATIENT
Start: 2021-05-25 | End: 2021-05-25 | Stop reason: SDUPTHER

## 2021-05-25 RX ORDER — MEGESTROL ACETATE 40 MG/1
40 TABLET ORAL DAILY
Status: DISCONTINUED | OUTPATIENT
Start: 2021-05-25 | End: 2021-05-25 | Stop reason: HOSPADM

## 2021-05-25 RX ORDER — KETOROLAC TROMETHAMINE 30 MG/ML
30 INJECTION, SOLUTION INTRAMUSCULAR; INTRAVENOUS ONCE
Status: DISCONTINUED | OUTPATIENT
Start: 2021-05-25 | End: 2021-05-25 | Stop reason: HOSPADM

## 2021-05-25 RX ORDER — MORPHINE SULFATE 2 MG/ML
2 INJECTION, SOLUTION INTRAMUSCULAR; INTRAVENOUS
Status: DISCONTINUED | OUTPATIENT
Start: 2021-05-25 | End: 2021-05-25 | Stop reason: HOSPADM

## 2021-05-25 RX ORDER — MORPHINE SULFATE 2 MG/ML
4 INJECTION, SOLUTION INTRAMUSCULAR; INTRAVENOUS
Status: DISCONTINUED | OUTPATIENT
Start: 2021-05-25 | End: 2021-05-25 | Stop reason: HOSPADM

## 2021-05-25 RX ORDER — IBUPROFEN 800 MG/1
800 TABLET ORAL EVERY 6 HOURS PRN
Status: DISCONTINUED | OUTPATIENT
Start: 2021-05-25 | End: 2021-05-25 | Stop reason: HOSPADM

## 2021-05-25 RX ADMIN — FENTANYL CITRATE 100 MCG: 50 INJECTION, SOLUTION INTRAMUSCULAR; INTRAVENOUS at 10:23

## 2021-05-25 RX ADMIN — MEGESTROL ACETATE 40 MG: 40 TABLET ORAL at 11:31

## 2021-05-25 RX ADMIN — HYDROCODONE BITARTRATE AND ACETAMINOPHEN 1 TABLET: 5; 325 TABLET ORAL at 11:31

## 2021-05-25 RX ADMIN — PROPOFOL 200 MG: 10 INJECTION, EMULSION INTRAVENOUS at 10:23

## 2021-05-25 RX ADMIN — ONDANSETRON HYDROCHLORIDE 4 MG: 4 INJECTION, SOLUTION INTRAMUSCULAR; INTRAVENOUS at 10:23

## 2021-05-25 RX ADMIN — MIDAZOLAM 2 MG: 1 INJECTION INTRAMUSCULAR; INTRAVENOUS at 10:23

## 2021-05-25 RX ADMIN — KETOROLAC TROMETHAMINE 30 MG: 30 INJECTION, SOLUTION INTRAMUSCULAR at 10:46

## 2021-05-25 RX ADMIN — SODIUM CHLORIDE, POTASSIUM CHLORIDE, SODIUM LACTATE AND CALCIUM CHLORIDE: 600; 310; 30; 20 INJECTION, SOLUTION INTRAVENOUS at 10:19

## 2021-05-25 RX ADMIN — DEXAMETHASONE SODIUM PHOSPHATE 10 MG: 10 INJECTION, EMULSION INTRAMUSCULAR; INTRAVENOUS at 10:23

## 2021-05-25 ASSESSMENT — PULMONARY FUNCTION TESTS
PIF_VALUE: 1
PIF_VALUE: 3
PIF_VALUE: 0
PIF_VALUE: 22
PIF_VALUE: 3
PIF_VALUE: 0
PIF_VALUE: 1
PIF_VALUE: 3
PIF_VALUE: 2
PIF_VALUE: 0
PIF_VALUE: 3
PIF_VALUE: 2

## 2021-05-25 ASSESSMENT — PAIN - FUNCTIONAL ASSESSMENT: PAIN_FUNCTIONAL_ASSESSMENT: 0-10

## 2021-05-25 NOTE — PROGRESS NOTES
1048: Patient to phase 1 recovery room via cart. Patient arouses very slightly to name. Patient placed on monitor and vitals obtained, see charting. Report received from surgical RN, Juan Manuel Ulloa. Patient is on 5 liters of oxygen via nasal canula. 1050: IV is infusing 0.9 into her arm. Usha pad is in place and is clean, dry and intact. 1055: Patient is resting in bed with eyes closed, respirations are even and unlabored. Vital signs remain stable. 1056: Patient's pulse ox is 98% on 5 liters- oxygen decreased to 3 liters. 1100: Patient is denying pain and nausea at this time. Vital signs remain stable. 1102: Patient's oxygen removed at this time due to patient pulling it out. Pulse ox is 96% at this time. 1110: Usha care done at this time due to the patient feeling like she is bleeding a lot. Scant amount of bloody drainage noted. Usha pad remains in place. 1114: Patient is denying nausea and rating pain a \"4\"/10. Patient stating her pain is a cramping feeling. 1117: Patient meets criteria to be discharged from phase 1 to phase 2. Patient moved to phase 2 at this time. 1119: Offered drink and snack provided to the patient. Bed is in the lowest position and call light is within reach. Patient's cousin is at the bedside. Patient instructed to put the call light on when she eats some of her snack and is ready for a pain pill- she verbalized understanding. 1131: Norco and Megace given at this time by Briana Khan RN- see MAR. Patient is rating pain a \"5\"/10 and denying nausea. 1206: Patient is rating pain a \"3\"/10 and denying nausea. Patient stating she is ready to go home. 1208: Discharge instructions given and explained to the patient and the patient's family, all verbalized understanding. 1210: IV removed at this time- no complications and dressing applied. 1219: Patient wheeled to the car and discharged home in stable condition with her family.

## 2021-05-25 NOTE — BRIEF OP NOTE
Brief Operative Report      Pre-operative Diagnosis:  Menorrhagia, enlarged uterus    Post-operative Diagnosis:  Same    Procedure:  Hysteroscopy, D&C    Surgeon: T. Sloan Libman MD     Anesthesia:  General endotrachial anesthesia    Estimated blood loss:  100 ml     Findings: See Operative Dictation, 18wk size uterus, irregular endometrium    Complications:  none      See dictated operative report for full details.       Leila Li MD

## 2021-05-25 NOTE — H&P
[facility]  History and Physical Update    Pt Name: Marcin Body  MRN: [id number]  YOB: 1980  Date of evaluation: 5/25/2021    [x] I have examined the patient and reviewed the H&P/Consult and there are no changes to the patient or plans.     [] I have examined the patient and reviewed the H&P/Consult and have noted the following changes:            Lynn Kincaid MD,MD  Electronically signed 5/25/2021 at 10:47 AM

## 2021-05-25 NOTE — OP NOTE
Gyn Service    Operative Report      Pt Name: Cj Elliott  MRN: [id number] [place] #: [de-identified]  YOB: 1980  Procedure Performed By: Pieter Galeano MD, MMATTHEW. Pre-operative Diagnosis: Menorrhagia, enlarged uterus    Post-operative Diagnosis:  SAME    Procedure:  Hysterosocopy with dilation and curettage    Surgeon:  Pieter Galeano MD, MD    Anesthesia:  General    Estimated blood loss:  100cc    Findings: 18wk size uterus, irregular endometrium    Complications:  NONE      Patient was taken to the operative room where general anesthesia was achieved without difficulty. She was then prepped and draped in the dorsal lithotomy position. The bladder was drained prior to procedure. Weighted speculum was placed into the vagina and the anterior lip of the cervix was grasped with a teneculum. The uterus was sounded. Then the Cervix was serially dilated with the use of luzmaria dilators. The hysteroscope was introduced with lactated ringer's as a distending medium and findings as noted above. A 4 quadrant Sharp curettage was performed and tissue was sent to pathology for permanent evaluation. Hemostasis was acchieved. All instruments were removed from the vagina. Sponge, instrument and needle counts were correct. The patient was awakened from anesthesia and taken to the recovery room in stable condition.

## 2021-05-25 NOTE — ANESTHESIA PRE PROCEDURE
BMI:   Wt Readings from Last 3 Encounters:   08/24/20 225 lb 6.4 oz (102.2 kg)   08/21/20 220 lb (99.8 kg)     There is no height or weight on file to calculate BMI.    CBC:   Lab Results   Component Value Date    WBC 7.9 05/18/2021    RBC 4.22 05/18/2021    HGB 10.0 05/18/2021    HCT 33.4 05/18/2021    MCV 79.1 05/18/2021     05/18/2021       CMP:   Lab Results   Component Value Date     08/21/2020    K 4.3 08/21/2020     08/21/2020    CO2 24 08/21/2020    BUN 14 08/21/2020    CREATININE 0.9 08/21/2020    LABGLOM 69 08/21/2020    GLUCOSE 136 08/21/2020    PROT 7.3 08/21/2020    CALCIUM 9.2 08/21/2020    BILITOT <0.2 08/21/2020    ALKPHOS 55 08/21/2020    AST 34 08/21/2020    ALT 38 08/21/2020       POC Tests: No results for input(s): POCGLU, POCNA, POCK, POCCL, POCBUN, POCHEMO, POCHCT in the last 72 hours. Coags: No results found for: PROTIME, INR, APTT    HCG (If Applicable):   Lab Results   Component Value Date    PREGSERUM NEGATIVE 08/21/2020        ABGs: No results found for: PHART, PO2ART, VON4LVU, MXA5RRN, BEART, J2FQACDZ     Type & Screen (If Applicable):  No results found for: LABABO, LABRH    Drug/Infectious Status (If Applicable):  No results found for: HIV, HEPCAB    COVID-19 Screening (If Applicable):   Lab Results   Component Value Date    COVID19 NOT DETECTED 05/18/2021           Anesthesia Evaluation  Patient summary reviewed and Nursing notes reviewed no history of anesthetic complications:   Airway: Mallampati: II        Dental:          Pulmonary: breath sounds clear to auscultation                             Cardiovascular:  Exercise tolerance: good (>4 METS),           Rhythm: regular  Rate: normal                    Neuro/Psych:               GI/Hepatic/Renal:             Endo/Other:                     Abdominal:       Abdomen: soft.     Vascular:                                        Anesthesia Plan      general     ASA 1       Induction: intravenous. MIPS: Postoperative opioids intended and Prophylactic antiemetics administered. Anesthetic plan and risks discussed with patient. Plan discussed with CRNA.                   [address] [facility], DO   5/25/2021

## 2021-07-19 ENCOUNTER — HOSPITAL ENCOUNTER (OUTPATIENT)
Age: 41
Discharge: HOME | End: 2021-07-19
Payer: COMMERCIAL

## 2021-07-19 LAB
ALBUMIN SERPL-MCNC: 4.5 G/DL (ref 3.5–5.1)
ALP BLD-CCNC: 52 U/L (ref 38–126)
ALT SERPL-CCNC: 22 U/L (ref 11–66)
ANION GAP SERPL CALCULATED.3IONS-SCNC: 11 MEQ/L (ref 8–16)
AST SERPL-CCNC: 19 U/L (ref 5–40)
BASOPHILS # BLD: 0.7 %
BASOPHILS ABSOLUTE: 0.1 THOU/MM3 (ref 0–0.1)
BILIRUB SERPL-MCNC: 0.3 MG/DL (ref 0.3–1.2)
BILIRUBIN DIRECT: < 0.2 MG/DL (ref 0–0.3)
BUN BLDV-MCNC: 14 MG/DL (ref 7–22)
CALCIUM SERPL-MCNC: 9.8 MG/DL (ref 8.5–10.5)
CHLORIDE BLD-SCNC: 105 MEQ/L (ref 98–111)
CO2: 23 MEQ/L (ref 23–33)
CREAT SERPL-MCNC: 0.9 MG/DL (ref 0.4–1.2)
EOSINOPHIL # BLD: 2.3 %
EOSINOPHILS ABSOLUTE: 0.2 THOU/MM3 (ref 0–0.4)
ERYTHROCYTE [DISTWIDTH] IN BLOOD BY AUTOMATED COUNT: 17.9 % (ref 11.5–14.5)
ERYTHROCYTE [DISTWIDTH] IN BLOOD BY AUTOMATED COUNT: 52 FL (ref 35–45)
GFR SERPL CREATININE-BSD FRML MDRD: 68 ML/MIN/1.73M2
GLUCOSE BLD-MCNC: 99 MG/DL (ref 70–108)
HCT VFR BLD CALC: 45.9 % (ref 37–47)
HEMOGLOBIN: 13.7 GM/DL (ref 12–16)
IMMATURE GRANS (ABS): 0.03 THOU/MM3 (ref 0–0.07)
IMMATURE GRANULOCYTES: 0.3 %
INFLUENZA A: NOT DETECTED
INFLUENZA B: NOT DETECTED
LYMPHOCYTES # BLD: 25.9 %
LYMPHOCYTES ABSOLUTE: 2.3 THOU/MM3 (ref 1–4.8)
MCH RBC QN AUTO: 24.6 PG (ref 26–33)
MCHC RBC AUTO-ENTMCNC: 29.8 GM/DL (ref 32.2–35.5)
MCV RBC AUTO: 82.4 FL (ref 81–99)
MONOCYTES # BLD: 8 %
MONOCYTES ABSOLUTE: 0.7 THOU/MM3 (ref 0.4–1.3)
NUCLEATED RED BLOOD CELLS: 0 /100 WBC
PLATELET # BLD: 271 THOU/MM3 (ref 130–400)
PMV BLD AUTO: 12.4 FL (ref 9.4–12.4)
POTASSIUM SERPL-SCNC: 4.3 MEQ/L (ref 3.5–5.2)
PREGNANCY, SERUM: NEGATIVE
RBC # BLD: 5.57 MILL/MM3 (ref 4.2–5.4)
SARS-COV-2 RNA, RT PCR: NOT DETECTED
SEG NEUTROPHILS: 62.8 %
SEGMENTED NEUTROPHILS ABSOLUTE COUNT: 5.5 THOU/MM3 (ref 1.8–7.7)
SODIUM BLD-SCNC: 139 MEQ/L (ref 135–145)
TOTAL PROTEIN: 7.9 G/DL (ref 6.1–8)
WBC # BLD: 8.8 THOU/MM3 (ref 4.8–10.8)

## 2021-07-19 PROCEDURE — 85025 COMPLETE CBC W/AUTO DIFF WBC: CPT

## 2021-07-19 PROCEDURE — 87636 SARSCOV2 & INF A&B AMP PRB: CPT

## 2021-07-19 PROCEDURE — 80053 COMPREHEN METABOLIC PANEL: CPT

## 2021-07-19 PROCEDURE — 36415 COLL VENOUS BLD VENIPUNCTURE: CPT

## 2021-07-19 PROCEDURE — 84703 CHORIONIC GONADOTROPIN ASSAY: CPT

## 2021-07-19 PROCEDURE — 82248 BILIRUBIN DIRECT: CPT

## 2022-03-03 ENCOUNTER — HOSPITAL ENCOUNTER (OUTPATIENT)
Dept: CT IMAGING | Age: 42
Discharge: HOME | End: 2022-03-03
Payer: COMMERCIAL

## 2022-03-03 DIAGNOSIS — Z85.42 HISTORY OF ENDOMETRIAL CANCER: ICD-10-CM

## 2022-03-03 PROCEDURE — 74177 CT ABD & PELVIS W/CONTRAST: CPT

## 2022-03-03 PROCEDURE — 6360000004 HC RX CONTRAST MEDICATION: Performed by: OBSTETRICS & GYNECOLOGY

## 2022-03-03 RX ADMIN — IOPAMIDOL 80 ML: 755 INJECTION, SOLUTION INTRAVENOUS at 10:57

## 2022-03-03 RX ADMIN — IOHEXOL 50 ML: 240 INJECTION, SOLUTION INTRATHECAL; INTRAVASCULAR; INTRAVENOUS; ORAL at 10:57

## (undated) DEVICE — PAD,NON-ADHERENT,3X8,STERILE,LF,1/PK: Brand: MEDLINE

## (undated) DEVICE — KENDALL SCD EXPRESS SLEEVES, KNEE LENGTH, MEDIUM: Brand: KENDALL SCD

## (undated) DEVICE — PREP SOL PVP IODINE 4%  4 OZ/BTL

## (undated) DEVICE — Z DISCONTINUED BY MEDLINE USE 2711682 TRAY SKIN PREP DRY W/ PREM GLV

## (undated) DEVICE — GLOVE ORANGE PI 7   MSG9070

## (undated) DEVICE — GAUZE,SPONGE,8"X4",12PLY,XRAY,STRL,LF: Brand: MEDLINE

## (undated) DEVICE — RED RUBBER ROBINSON URETHRAL CATHETER, RADIOPAQUE, SMOOTH ROUNDED TIP, 14 FR (4.7 MM): Brand: DOVER

## (undated) DEVICE — GOWN,SIRUS,NON REINFRCD,LARGE,SET IN SL: Brand: MEDLINE

## (undated) DEVICE — CATHETER ABLATN RF

## (undated) DEVICE — SURE SET SINGLE BASIN-LF: Brand: MEDLINE INDUSTRIES, INC.

## (undated) DEVICE — PACK,SET UP,NO DRAPES: Brand: MEDLINE

## (undated) DEVICE — SOLUTION SURG PREP POV IOD 7.5% 4 OZ

## (undated) DEVICE — TOWEL,OR,DSP,ST,BLUE,STD,4/PK,20PK/CS: Brand: MEDLINE

## (undated) DEVICE — TUBING, SUCTION, 1/4" X 12', STRAIGHT: Brand: MEDLINE

## (undated) DEVICE — PAD,SANITARY,11 IN,MAXI,W/WINGS,N-STRL: Brand: MEDLINE

## (undated) DEVICE — CYSTO/BLADDER IRRIGATION SET, REGULATING CLAMP

## (undated) DEVICE — SOLUTION IV 1000ML 0.9% SOD CHL PH 5 INJ USP VIAFLX PLAS

## (undated) DEVICE — DRAPE,UNDERBUTTOCKS,PCH,STERILE: Brand: MEDLINE